# Patient Record
Sex: MALE | Race: WHITE
[De-identification: names, ages, dates, MRNs, and addresses within clinical notes are randomized per-mention and may not be internally consistent; named-entity substitution may affect disease eponyms.]

---

## 2021-09-29 ENCOUNTER — HOSPITAL ENCOUNTER (OUTPATIENT)
Dept: HOSPITAL 52 - LL.ED | Age: 41
Setting detail: OBSERVATION
LOS: 1 days | Discharge: HOME | End: 2021-09-30
Payer: COMMERCIAL

## 2021-09-29 DIAGNOSIS — M25.571: ICD-10-CM

## 2021-09-29 DIAGNOSIS — T07.XXXA: ICD-10-CM

## 2021-09-29 DIAGNOSIS — S43.005A: Primary | ICD-10-CM
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
## 2021-09-29 DIAGNOSIS — S22.32XA: ICD-10-CM

## 2021-09-29 DIAGNOSIS — S43.004A: ICD-10-CM

## 2021-09-29 LAB
ANION GAP SERPL CALC-SCNC: 13.7 MEQ/L (ref 7–15)
BARBITURATES UR QL SCN: NEGATIVE
BENZODIAZ UR QL SCN: POSITIVE
BUPRENORPHINE UR QL: NEGATIVE
CHLORIDE SERPL-SCNC: 102 MMOL/L (ref 98–107)
EDDP UR QL: NEGATIVE
SODIUM SERPL-SCNC: 140 MMOL/L (ref 136–145)
TCA UR-MCNC: NEGATIVE UG/ML
THC UR QL SCN>50 NG/ML: NEGATIVE

## 2021-09-29 RX ADMIN — Medication PRN ML: at 10:44

## 2021-09-29 RX ADMIN — NEOMYCIN AND POLYMYXIN B SULFATES AND BACITRACIN ZINC SCH EACH: 400; 3.5; 5 OINTMENT TOPICAL at 19:46

## 2021-09-29 RX ADMIN — Medication PRN ML: at 18:20

## 2021-09-29 RX ADMIN — Medication PRN ML: at 09:57

## 2021-09-29 RX ADMIN — Medication PRN ML: at 10:41

## 2021-09-29 RX ADMIN — Medication PRN ML: at 10:46

## 2021-09-29 RX ADMIN — Medication PRN ML: at 08:54
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
## 2021-09-30 RX ADMIN — NEOMYCIN AND POLYMYXIN B SULFATES AND BACITRACIN ZINC SCH EACH: 400; 3.5; 5 OINTMENT TOPICAL at 08:36

## 2021-09-30 RX ADMIN — Medication PRN ML: at 04:13
